# Patient Record
Sex: FEMALE | Race: WHITE | NOT HISPANIC OR LATINO
[De-identification: names, ages, dates, MRNs, and addresses within clinical notes are randomized per-mention and may not be internally consistent; named-entity substitution may affect disease eponyms.]

---

## 2020-04-26 ENCOUNTER — MESSAGE (OUTPATIENT)
Age: 30
End: 2020-04-26

## 2020-05-15 LAB
SARS-COV-2 IGG SERPL IA-ACNC: <0.1 INDEX
SARS-COV-2 IGG SERPL QL IA: NEGATIVE

## 2020-08-10 PROBLEM — Z00.00 ENCOUNTER FOR PREVENTIVE HEALTH EXAMINATION: Status: ACTIVE | Noted: 2020-08-10

## 2021-08-30 ENCOUNTER — NON-APPOINTMENT (OUTPATIENT)
Age: 31
End: 2021-08-30

## 2021-11-10 ENCOUNTER — APPOINTMENT (OUTPATIENT)
Dept: DERMATOLOGY | Facility: CLINIC | Age: 31
End: 2021-11-10

## 2021-11-29 ENCOUNTER — NON-APPOINTMENT (OUTPATIENT)
Age: 31
End: 2021-11-29

## 2022-01-11 ENCOUNTER — APPOINTMENT (OUTPATIENT)
Dept: DERMATOLOGY | Facility: CLINIC | Age: 32
End: 2022-01-11
Payer: COMMERCIAL

## 2022-01-11 ENCOUNTER — TRANSCRIPTION ENCOUNTER (OUTPATIENT)
Age: 32
End: 2022-01-11

## 2022-01-11 VITALS — HEIGHT: 69 IN | BODY MASS INDEX: 23.7 KG/M2 | WEIGHT: 160 LBS

## 2022-01-11 PROCEDURE — 99203 OFFICE O/P NEW LOW 30 MIN: CPT

## 2022-01-11 RX ORDER — DAPSONE 75 MG/G
7.5 GEL TOPICAL
Qty: 1 | Refills: 6 | Status: ACTIVE | COMMUNITY
Start: 2022-01-11 | End: 1900-01-01

## 2022-01-11 RX ORDER — SPIRONOLACTONE 100 MG/1
100 TABLET ORAL DAILY
Qty: 30 | Refills: 6 | Status: ACTIVE | COMMUNITY
Start: 2022-01-11 | End: 1900-01-01

## 2022-01-14 RX ORDER — TRETINOIN 0.1 MG/G
0.01 GEL TOPICAL
Qty: 1 | Refills: 3 | Status: ACTIVE | COMMUNITY
Start: 2022-01-11

## 2022-03-29 ENCOUNTER — APPOINTMENT (OUTPATIENT)
Dept: PRIMARY CARE | Facility: HOSPITAL | Age: 32
End: 2022-03-29

## 2022-03-29 ENCOUNTER — OUTPATIENT (OUTPATIENT)
Dept: OUTPATIENT SERVICES | Facility: HOSPITAL | Age: 32
LOS: 1 days | End: 2022-03-29

## 2022-03-29 VITALS
WEIGHT: 160 LBS | HEART RATE: 97 BPM | SYSTOLIC BLOOD PRESSURE: 130 MMHG | OXYGEN SATURATION: 99 % | TEMPERATURE: 98.3 F | HEIGHT: 69 IN | DIASTOLIC BLOOD PRESSURE: 81 MMHG | BODY MASS INDEX: 23.7 KG/M2

## 2022-03-29 DIAGNOSIS — Z20.822 CONTACT WITH AND (SUSPECTED) EXPOSURE TO COVID-19: ICD-10-CM

## 2022-03-29 LAB — SARS-COV-2 N GENE NPH QL NAA+PROBE: NOT DETECTED

## 2022-03-29 NOTE — HISTORY OF PRESENT ILLNESS
[FreeTextEntry8] : 31F amoxicillin allergy with no PMH and no PSH who [facility] for [facility] PCR.\par \par States that she was exposed to patient who was tested + COVID. Most of her co-workers who were exposed came back + COVID. She endorses dry cough and nasal congestion, but she also claims history of seasonal allergies. She has been fully vaccinated and boosted.  Denies any fever, cough, sore throat or SOB. No loss of smell or taste, no chest tightness, or any GI related symptoms of nausea, vomiting or diarrhea. 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 She takes Spironolactone as  regular maintenance medications. Denies any chest pain, no chest palpitations or any respiratory distress\par \par \par \par

## 2022-03-29 NOTE — PHYSICAL EXAM
[No Acute Distress] : no acute distress [Normal Sclera/Conjunctiva] : normal sclera/conjunctiva [Normal Outer Ear/Nose] : the outer ears and nose were normal in appearance [No Respiratory Distress] : no respiratory distress  [No Accessory Muscle Use] : no accessory muscle use [Clear to Auscultation] : lungs were clear to auscultation bilaterally [Normal Rate] : normal rate  [Regular Rhythm] : with a regular rhythm [Normal S1, S2] : normal S1 and S2 [No Focal Deficits] : no focal deficits [Normal Gait] : normal gait [Normal Affect] : the affect was normal [Normal Insight/Judgement] : insight and judgment were intact [de-identified] : no tonsular exudates, no tonsular swelling and no maxillary tenderness  [de-identified] : no wheezing, no rhonchi and no crackles  [de-identified] : no chest tenderness

## 2022-03-31 DIAGNOSIS — Z20.822 CONTACT WITH AND (SUSPECTED) EXPOSURE TO COVID-19: ICD-10-CM

## 2022-04-02 ENCOUNTER — TRANSCRIPTION ENCOUNTER (OUTPATIENT)
Age: 32
End: 2022-04-02

## 2022-05-03 ENCOUNTER — APPOINTMENT (OUTPATIENT)
Dept: ENDOCRINOLOGY | Facility: CLINIC | Age: 32
End: 2022-05-03
Payer: COMMERCIAL

## 2022-05-03 VITALS
TEMPERATURE: 98.6 F | DIASTOLIC BLOOD PRESSURE: 74 MMHG | SYSTOLIC BLOOD PRESSURE: 120 MMHG | HEART RATE: 87 BPM | RESPIRATION RATE: 15 BRPM | OXYGEN SATURATION: 99 %

## 2022-05-03 VITALS — BODY MASS INDEX: 25.1 KG/M2 | WEIGHT: 170 LBS

## 2022-05-03 DIAGNOSIS — E53.8 DEFICIENCY OF OTHER SPECIFIED B GROUP VITAMINS: ICD-10-CM

## 2022-05-03 DIAGNOSIS — E55.9 VITAMIN D DEFICIENCY, UNSPECIFIED: ICD-10-CM

## 2022-05-03 DIAGNOSIS — L70.9 ACNE, UNSPECIFIED: ICD-10-CM

## 2022-05-03 PROCEDURE — 99204 OFFICE O/P NEW MOD 45 MIN: CPT | Mod: 25

## 2022-05-03 PROCEDURE — 36415 COLL VENOUS BLD VENIPUNCTURE: CPT

## 2022-05-04 LAB
25(OH)D3 SERPL-MCNC: 47.5 NG/ML
T3FREE SERPL-MCNC: 3.08 PG/ML
T4 FREE SERPL-MCNC: 1.3 NG/DL
TSH SERPL-ACNC: 1.09 UIU/ML
VIT B12 SERPL-MCNC: 562 PG/ML

## 2022-05-05 LAB
THYROGLOB AB SERPL-ACNC: <20 IU/ML
THYROPEROXIDASE AB SERPL IA-ACNC: 10.8 IU/ML

## 2022-05-10 PROBLEM — L70.9 ACNE: Status: ACTIVE | Noted: 2022-01-11

## 2022-05-10 NOTE — HISTORY OF PRESENT ILLNESS
[FreeTextEntry1] : Ms. REILLY  is a 31 year old  female  who presents for initial endocrine evaluation. She presents with regard to a history of nodular thyroid . She presents via the kind courtesy of  PMD Dr. Tayler Peterson in [place]. She was initially diagnosed with nodular thyroid in 2016. She has had FNA of 2 nodules back in 2016 in which both nodules returned benign.  Since then, she has not had to do another biopsy. She has had repeat US in 2019 which was stable. She recently had another thyroid US in December 2021 which revealed Left midpole nodule 2.09 cm x 1.24 cm x 1.03 cm TR4 and 3.3 x 0.8 x 1.6 cm right level 2 lymph node with normal morphology and intact fatty hilum and 1.6 x 0.8 x 1.1 cm level 2 lymph node. Denies medical hx of thyroid dysfunction. Denies family hx of thyroid cancer. Maternal aunts and grandmother have  hypothyroidism. Denies anterior neck discomfort, difficulty swallowing, or any change in the appearance of the neck region. Denies palpitations, tremors, anxiousness or severe fatigue, temp intolerance, significant weight changes or hair loss. Too, denies any changes in skin or nails.\par \par Labs from 11/2021 show vitamin b12 246. vitamin D 14.8 (patient has since vitamin D 50801 once weekly). TSH 1.495 \par  Additional medical history includes that of acne, vitamin D deficiency, B12 deficiency\par Medications: Spirnolactone 100 mg ( follows with derm Dr. Rizzo), vitamin D 27530 once weekly \par Had echo done in January 2022 for palpitations which showed , is following with cardiologist Dr. Behzan Paimany in [place] \par \par  PMD Dr. Tayler Peterson\par Previously was seeing endocrinologist in [place] \par \par Family Hx: Father with HTN and HLD \par No surgeries or hospitalizations \par \par Social Hx: Denies smoking or drug use. Drinks alcohol socially. Works as Pediatric Resident \par COVID vaccinated. Had COVID in April 2022, mild sx and no residual symptoms

## 2022-05-16 ENCOUNTER — NON-APPOINTMENT (OUTPATIENT)
Age: 32
End: 2022-05-16

## 2022-05-16 DIAGNOSIS — E04.1 NONTOXIC SINGLE THYROID NODULE: ICD-10-CM

## 2022-05-18 PROBLEM — E04.1 THYROID NODULE: Status: ACTIVE | Noted: 2022-05-03

## 2022-05-19 ENCOUNTER — TRANSCRIPTION ENCOUNTER (OUTPATIENT)
Age: 32
End: 2022-05-19

## 2022-05-19 RX ORDER — AZELAIC ACID 0.15 G/G
15 GEL TOPICAL
Qty: 1 | Refills: 4 | Status: ACTIVE | COMMUNITY
Start: 2022-01-14 | End: 1900-01-01

## 2022-05-24 ENCOUNTER — TRANSCRIPTION ENCOUNTER (OUTPATIENT)
Age: 32
End: 2022-05-24

## 2022-05-27 ENCOUNTER — OUTPATIENT (OUTPATIENT)
Dept: OUTPATIENT SERVICES | Facility: HOSPITAL | Age: 32
LOS: 1 days | End: 2022-05-27

## 2022-05-27 DIAGNOSIS — E04.1 NONTOXIC SINGLE THYROID NODULE: ICD-10-CM

## 2022-05-31 ENCOUNTER — NON-APPOINTMENT (OUTPATIENT)
Age: 32
End: 2022-05-31

## 2022-06-03 ENCOUNTER — NON-APPOINTMENT (OUTPATIENT)
Age: 32
End: 2022-06-03

## 2022-06-03 ENCOUNTER — OUTPATIENT (OUTPATIENT)
Dept: OUTPATIENT SERVICES | Facility: HOSPITAL | Age: 32
LOS: 1 days | End: 2022-06-03
Payer: COMMERCIAL

## 2022-06-03 ENCOUNTER — APPOINTMENT (OUTPATIENT)
Dept: INTERVENTIONAL RADIOLOGY/VASCULAR | Facility: HOSPITAL | Age: 32
End: 2022-06-03
Payer: COMMERCIAL

## 2022-06-03 ENCOUNTER — RESULT REVIEW (OUTPATIENT)
Age: 32
End: 2022-06-03

## 2022-06-03 DIAGNOSIS — E04.1 NONTOXIC SINGLE THYROID NODULE: ICD-10-CM

## 2022-06-03 PROCEDURE — 76942 ECHO GUIDE FOR BIOPSY: CPT | Mod: 26

## 2022-06-03 PROCEDURE — 88173 CYTOPATH EVAL FNA REPORT: CPT | Mod: 26

## 2022-06-03 PROCEDURE — 60100 BIOPSY OF THYROID: CPT

## 2022-06-03 PROCEDURE — 88173 CYTOPATH EVAL FNA REPORT: CPT

## 2022-06-03 PROCEDURE — 76942 ECHO GUIDE FOR BIOPSY: CPT

## 2022-06-24 ENCOUNTER — TRANSCRIPTION ENCOUNTER (OUTPATIENT)
Age: 32
End: 2022-06-24